# Patient Record
Sex: FEMALE | Race: BLACK OR AFRICAN AMERICAN | NOT HISPANIC OR LATINO | URBAN - METROPOLITAN AREA
[De-identification: names, ages, dates, MRNs, and addresses within clinical notes are randomized per-mention and may not be internally consistent; named-entity substitution may affect disease eponyms.]

---

## 2024-08-09 ENCOUNTER — HOSPITAL ENCOUNTER (EMERGENCY)
Facility: OTHER | Age: 34
Discharge: HOME | End: 2024-08-09
Attending: OBSTETRICS & GYNECOLOGY
Payer: MEDICAID

## 2024-08-09 VITALS
HEART RATE: 104 BPM | SYSTOLIC BLOOD PRESSURE: 118 MMHG | OXYGEN SATURATION: 100 % | TEMPERATURE: 98 F | DIASTOLIC BLOOD PRESSURE: 62 MMHG | RESPIRATION RATE: 17 BRPM

## 2024-08-09 DIAGNOSIS — R12 HEARTBURN DURING PREGNANCY IN THIRD TRIMESTER: ICD-10-CM

## 2024-08-09 DIAGNOSIS — O26.893 HEARTBURN DURING PREGNANCY IN THIRD TRIMESTER: ICD-10-CM

## 2024-08-09 DIAGNOSIS — Z3A.38 38 WEEKS GESTATION OF PREGNANCY: ICD-10-CM

## 2024-08-09 DIAGNOSIS — O09.30 LIMITED PRENATAL CARE, ANTEPARTUM: Primary | ICD-10-CM

## 2024-08-09 LAB
ABO + RH BLD: NORMAL
BASOPHILS # BLD AUTO: 0.03 K/UL (ref 0–0.2)
BASOPHILS NFR BLD: 0.3 % (ref 0–1.9)
BLD GP AB SCN CELLS X3 SERPL QL: NORMAL
DIFFERENTIAL METHOD BLD: ABNORMAL
EOSINOPHIL # BLD AUTO: 0.1 K/UL (ref 0–0.5)
EOSINOPHIL NFR BLD: 1 % (ref 0–8)
ERYTHROCYTE [DISTWIDTH] IN BLOOD BY AUTOMATED COUNT: 20.4 % (ref 11.5–14.5)
HAV IGM SERPL QL IA: NORMAL
HBV CORE IGM SERPL QL IA: NORMAL
HBV SURFACE AG SERPL QL IA: NORMAL
HCT VFR BLD AUTO: 24.4 % (ref 37–48.5)
HCV AB SERPL QL IA: NORMAL
HGB BLD-MCNC: 7.3 G/DL (ref 12–16)
HIV 1+2 AB+HIV1 P24 AG SERPL QL IA: NEGATIVE
IMM GRANULOCYTES # BLD AUTO: 0.07 K/UL (ref 0–0.04)
IMM GRANULOCYTES NFR BLD AUTO: 0.8 % (ref 0–0.5)
LYMPHOCYTES # BLD AUTO: 1.8 K/UL (ref 1–4.8)
LYMPHOCYTES NFR BLD: 20.8 % (ref 18–48)
MCH RBC QN AUTO: 20.2 PG (ref 27–31)
MCHC RBC AUTO-ENTMCNC: 29.9 G/DL (ref 32–36)
MCV RBC AUTO: 68 FL (ref 82–98)
MONOCYTES # BLD AUTO: 0.7 K/UL (ref 0.3–1)
MONOCYTES NFR BLD: 8.4 % (ref 4–15)
NEUTROPHILS # BLD AUTO: 6 K/UL (ref 1.8–7.7)
NEUTROPHILS NFR BLD: 68.7 % (ref 38–73)
NRBC BLD-RTO: 1 /100 WBC
PLATELET # BLD AUTO: 287 K/UL (ref 150–450)
PMV BLD AUTO: 10.2 FL (ref 9.2–12.9)
RBC # BLD AUTO: 3.61 M/UL (ref 4–5.4)
SPECIMEN OUTDATE: NORMAL
TREPONEMA PALLIDUM IGG+IGM AB [PRESENCE] IN SERUM OR PLASMA BY IMMUNOASSAY: NONREACTIVE
WBC # BLD AUTO: 8.73 K/UL (ref 3.9–12.7)

## 2024-08-09 PROCEDURE — 85025 COMPLETE CBC W/AUTO DIFF WBC: CPT

## 2024-08-09 PROCEDURE — 87081 CULTURE SCREEN ONLY: CPT

## 2024-08-09 PROCEDURE — 76815 OB US LIMITED FETUS(S): CPT | Mod: 26,,, | Performed by: OBSTETRICS & GYNECOLOGY

## 2024-08-09 PROCEDURE — 81514 NFCT DS BV&VAGINITIS DNA ALG: CPT

## 2024-08-09 PROCEDURE — 80074 ACUTE HEPATITIS PANEL: CPT

## 2024-08-09 PROCEDURE — 86850 RBC ANTIBODY SCREEN: CPT

## 2024-08-09 PROCEDURE — 87389 HIV-1 AG W/HIV-1&-2 AB AG IA: CPT

## 2024-08-09 PROCEDURE — 86762 RUBELLA ANTIBODY: CPT

## 2024-08-09 PROCEDURE — 87591 N.GONORRHOEAE DNA AMP PROB: CPT

## 2024-08-09 PROCEDURE — 86593 SYPHILIS TEST NON-TREP QUANT: CPT

## 2024-08-09 PROCEDURE — 99284 EMERGENCY DEPT VISIT MOD MDM: CPT | Mod: 25,,, | Performed by: OBSTETRICS & GYNECOLOGY

## 2024-08-09 PROCEDURE — 86901 BLOOD TYPING SEROLOGIC RH(D): CPT

## 2024-08-09 PROCEDURE — 59025 FETAL NON-STRESS TEST: CPT | Mod: 26,59,, | Performed by: OBSTETRICS & GYNECOLOGY

## 2024-08-09 PROCEDURE — 25000003 PHARM REV CODE 250

## 2024-08-09 PROCEDURE — 86787 VARICELLA-ZOSTER ANTIBODY: CPT

## 2024-08-09 PROCEDURE — 59025 FETAL NON-STRESS TEST: CPT

## 2024-08-09 PROCEDURE — 86900 BLOOD TYPING SEROLOGIC ABO: CPT

## 2024-08-09 PROCEDURE — 99284 EMERGENCY DEPT VISIT MOD MDM: CPT | Mod: 25

## 2024-08-09 RX ORDER — FAMOTIDINE 20 MG/1
20 TABLET, FILM COATED ORAL 2 TIMES DAILY
Status: COMPLETED | OUTPATIENT
Start: 2024-08-09 | End: 2024-08-09

## 2024-08-09 RX ADMIN — FAMOTIDINE 20 MG: 20 TABLET, FILM COATED ORAL at 06:08

## 2024-08-09 NOTE — DISCHARGE INSTRUCTIONS
Call clinic [phone] or L & D after hours at [phone] for vaginal bleeding, leakage of fluids, contractions 4-5 in one hour, decreased fetal movements ( 10 kicks in 2 hours), headache not relieved by Tylenol, blurry vision, or temp of 100.4 or greater.  Begin doing fetal kick counts, at least 10 movements in 2 hours starting at 28 weeks gestation.  Keep next clinic appointment

## 2024-08-09 NOTE — ED PROVIDER NOTES
Encounter Date: 8/9/2024       History     Chief Complaint   Patient presents with    Vaginal Bleeding     Nida Iglesias is a 33 y.o with approximated gestation at 38 weeks presents to the OB ED today (08/09/2024) with CC of establishing care.    Patient denies any specific complaints today and is focused on establishing care. She notes irregular cramping/discomfort in the heat, soreness and has acid reflux. Recent spotting on/off. She has previously had yeast infections, however currently denies vaginal irritation.     Patient recently moved back to [place] from [place] where she was living with her baby's father. She notes inconsistent prenatal care however visited a placed called '[facility]' 3-4 times where she was given a due date of August 12th.  Patient notes recent stressors in life and is planning adoption process for child.     Patient denies any known complications with this pregnancy. No daily medications.  She reports no current vaginal bleeding, no leakage of fluid or contractions. Good FM.     This pregnancy is complicated by limited prenatal care.            Review of patient's allergies indicates:  No Known Allergies  No past medical history on file.  No past surgical history on file.  No family history on file.  Social History     Tobacco Use    Smoking status: Every Day     Types: Vaping with nicotine    Smokeless tobacco: Never   Substance Use Topics    Alcohol use: Yes    Drug use: Yes     Types: Marijuana     Review of Systems   Constitutional:  Negative for chills and fever.   Respiratory:  Negative for shortness of breath.    Cardiovascular:  Negative for chest pain.   Gastrointestinal:  Negative for diarrhea, nausea and vomiting.        Heartburn   Genitourinary:  Negative for dysuria, frequency, hematuria, urgency, vaginal bleeding, vaginal discharge and vaginal pain.       Physical Exam     Initial Vitals [08/09/24 1541]   BP Pulse Resp Temp SpO2   130/62 98 17 98.3 °F (36.8 °C)  100 %      MAP       --         Physical Exam    Vitals reviewed.  Constitutional: She appears well-developed and well-nourished.   HENT:   Head: Normocephalic and atraumatic.   Eyes: EOM are normal.   Neck:   Normal range of motion.  Cardiovascular:  Normal rate.           Pulmonary/Chest: No respiratory distress.   Abdominal: Abdomen is soft. There is no abdominal tenderness.   Gravid   Musculoskeletal:         General: Normal range of motion.      Cervical back: Normal range of motion.     Neurological: She is oriented to person, place, and time.   Skin: Skin is warm and dry.   Psychiatric: She has a normal mood and affect.     OB LABOR EXAM:         Vaginal Bleeding: none present.     Dilatation: 0.       Amniotic Fluid Color: no fluid.           ED Course   Obtain Fetal nonstress test (NST)    Date/Time: 8/10/2024 2:25 AM    Performed by: Leigha Denton MD  Authorized by: Leigha Denton MD    Nonstress Test:     Variability:  6-25 BPM    Decelerations:  None    Accelerations:  15 bpm    Baseline:  155    Contractions:  Irregular  Biophysical Profile:     Nonstress Test Interpretation: reactive      Overall Impression:  Reassuring    Labs Reviewed   CBC W/ AUTO DIFFERENTIAL - Abnormal       Result Value    WBC 8.73      RBC 3.61 (*)     Hemoglobin 7.3 (*)     Hematocrit 24.4 (*)     MCV 68 (*)     MCH 20.2 (*)     MCHC 29.9 (*)     RDW 20.4 (*)     Platelets 287      MPV 10.2      Immature Granulocytes 0.8 (*)     Gran # (ANC) 6.0      Immature Grans (Abs) 0.07 (*)     Lymph # 1.8      Mono # 0.7      Eos # 0.1      Baso # 0.03      nRBC 1 (*)     Gran % 68.7      Lymph % 20.8      Mono % 8.4      Eosinophil % 1.0      Basophil % 0.3      Differential Method Automated      Narrative:     Release to patient->Immediate   STREP B SCREEN, VAGINAL / RECTAL   HEPATITIS PANEL, ACUTE    Hepatitis B Surface Ag Non-reactive      Hep B C IgM Non-reactive      Hep A IgM Non-reactive      Hepatitis C Ab Non-reactive       Narrative:     Release to patient->Immediate   HIV 1 / 2 ANTIBODY    HIV 1/2 Ag/Ab Negative      Narrative:     Release to patient->Immediate   TREPONEMA PALLIDIUM ANTIBODIES IGG, IGM    Treponema Pallidum Antibodies (IgG, IgM) Nonreactive      Narrative:     Release to patient->Immediate   VARICELLA ZOSTER ANTIBODY, IGG   C. TRACHOMATIS/N. GONORRHOEAE BY AMP DNA   VAGINOSIS SCREEN BY DNA PROBE   RUBELLA ANTIBODY, IGG   TYPE & SCREEN    Group & Rh O POS      Indirect Kishore NEG      Specimen Outdate 08/12/2024 23:59            Imaging Results    None          Medications   famotidine tablet 20 mg (20 mg Oral Given 8/9/24 1803)     Medical Decision Making  Nida Iglesias is a 33 y.o with 38 week gestation based on unclear dating who presents to the OB ED today (08/09/2024) with CC of establishing care.      Temp:  [98.3 °F (36.8 °C)] 98.3 °F (36.8 °C)  Pulse:  [] 104  Resp:  [17] 17  SpO2:  [100 %] 100 %  BP: (104-130)/(58-75) 118/62        1) Patient presenting to establish care  - Patient with inconsistent care in [place], recently moved to [place]  - No records on file, no dating information  - Limited bedsite growth US in ED showing 38w0d per femur length, however, remaining measurements somewhat smaller  - Full prenatal labs drawn  - Vaginosis/GC/CT/GBS collected  - Patient referred for formal dating/anatomy US and to follow up in [facility] clinic  - Clinic messaged, patient happy with plans    2) Cramping Discomfort   - Irregularly kelly on monitor, patient feeling minimal cramping  - SVE closed, not currently in labor    3) Acid Reflux  - Burning sensation in throat w/epigastric discomfort  - Pepcid given, improved      - Patient stable for discharge at this time  - Notes stressors lately, however feels safe to go home  - ED return precautions given  - She voiced understanding and is in agreement with the plan    Leigha Denton MD  OBGYN   PGY-1                 Attending Attestation:   Physician  Attestation Statement for Resident:  As the supervising MD   Physician Attestation Statement: I have personally seen and examined this patient.   I agree with the above history.  -:   As the supervising MD I agree with the above PE.     As the supervising MD I agree with the above treatment, course, plan, and disposition.   I was personally present during the critical portions of the procedure(s) performed by the resident and was immediately available in the ED to provide services and assistance as needed during the entire procedure.  I have reviewed and agree with the residents interpretation of the following: lab data.  I have reviewed the following: old records at this facility.            Attending ED Notes:   I saw and examined the patient myself along with the resident. I have personally reviewed pertinent prior records, labs, imaging, and procedures. I have reviewed the documentation and agree with the findings and plan as documented.     ~38wga based on reported BOYD, presenting to establish care after recently moving back into [facility]. Stable with no acute emergent concerns, not currently in labor, NST reactive and reassuring, limited bedside US grossly normal. Prenatal labs obtained. Discharge home with close outpatient follow up to establish care and for formal US. Return precautions discussed.    Ines Sethi MD FACOG  OB Hospitalist                               Clinical Impression:  Final diagnoses:  [O09.30] Limited prenatal care, antepartum (Primary)  [Z3A.38] 38 weeks gestation of pregnancy  [O26.893, R12] Heartburn during pregnancy in third trimester          ED Disposition Condition    Discharge Stable          ED Prescriptions    None       Follow-up Information       Follow up With Specialties Details Why Contact Info    [facility] - Emergency Dept (Obstetrics) Emergency Medicine  If symptoms worsen [address]  [facility] [id number] [phone]    [facility]  Obstetrics and Gynecology In 3 days  [address]  [facility] [address]  [phone]             Leigha Denton MD  Resident  08/10/24 0248       Leigha Denton MD  Resident  08/10/24 0251       Ines Sethi MD  08/10/24 1000

## 2024-08-10 LAB
C TRACH DNA SPEC QL NAA+PROBE: NOT DETECTED
N GONORRHOEA DNA SPEC QL NAA+PROBE: NOT DETECTED

## 2024-08-12 ENCOUNTER — TELEPHONE (OUTPATIENT)
Dept: OBSTETRICS AND GYNECOLOGY | Facility: CLINIC | Age: 34
End: 2024-08-12
Payer: MEDICAID

## 2024-08-12 LAB
BACTERIA SPEC AEROBE CULT: NORMAL
BACTERIAL VAGINOSIS DNA: NEGATIVE
CANDIDA GLABRATA DNA: NEGATIVE
CANDIDA KRUSEI DNA: NEGATIVE
CANDIDA RRNA VAG QL PROBE: POSITIVE
RUBV IGG SER-ACNC: 25.6 IU/ML
RUBV IGG SER-IMP: REACTIVE
T VAGINALIS RRNA GENITAL QL PROBE: NEGATIVE
VARICELLA INTERPRETATION: POSITIVE
VARICELLA ZOSTER IGG: 747.1

## 2024-08-12 NOTE — TELEPHONE ENCOUNTER
----- Message from Leigha Denton MD sent at 8/10/2024  2:46 AM CDT -----  Regarding: Establish Care  Hel -    Can we please see this patient in clinic this week to establish care. She just moved from [place]. Estimated at 38 weeks per growth in ED.     Thank you!  Leigha

## 2024-08-15 ENCOUNTER — HOSPITAL ENCOUNTER (INPATIENT)
Facility: OTHER | Age: 34
LOS: 2 days | Discharge: HOME | End: 2024-08-17
Attending: OBSTETRICS & GYNECOLOGY | Admitting: OBSTETRICS & GYNECOLOGY
Payer: MEDICAID

## 2024-08-15 ENCOUNTER — ANESTHESIA EVENT (OUTPATIENT)
Dept: OBSTETRICS AND GYNECOLOGY | Facility: OTHER | Age: 34
End: 2024-08-15
Payer: MEDICAID

## 2024-08-15 ENCOUNTER — TELEPHONE (OUTPATIENT)
Dept: OBSTETRICS AND GYNECOLOGY | Facility: CLINIC | Age: 34
End: 2024-08-15
Payer: MEDICAID

## 2024-08-15 ENCOUNTER — ANESTHESIA (OUTPATIENT)
Dept: OBSTETRICS AND GYNECOLOGY | Facility: OTHER | Age: 34
End: 2024-08-15
Payer: MEDICAID

## 2024-08-15 DIAGNOSIS — Z3A.40 40 WEEKS GESTATION OF PREGNANCY: ICD-10-CM

## 2024-08-15 DIAGNOSIS — O47.9 UTERINE CONTRACTIONS: ICD-10-CM

## 2024-08-15 DIAGNOSIS — Z37.9 NORMAL LABOR: ICD-10-CM

## 2024-08-15 DIAGNOSIS — O42.90 LEAKAGE OF AMNIOTIC FLUID: ICD-10-CM

## 2024-08-15 PROBLEM — O09.30 NO PRENATAL CARE IN CURRENT PREGNANCY: Status: RESOLVED | Noted: 2024-08-15 | Resolved: 2024-08-15

## 2024-08-15 PROBLEM — O09.30 NO PRENATAL CARE IN CURRENT PREGNANCY: Status: ACTIVE | Noted: 2024-08-15

## 2024-08-15 PROBLEM — Z34.90 PREGNANCY WITH FETUS OF UNKNOWN GESTATIONAL AGE: Status: ACTIVE | Noted: 2024-08-15

## 2024-08-15 PROBLEM — Z86.59 HISTORY OF PSYCHIATRIC DISORDER: Status: ACTIVE | Noted: 2024-08-15

## 2024-08-15 PROBLEM — Z34.90 PREGNANCY WITH FETUS OF UNKNOWN GESTATIONAL AGE: Status: RESOLVED | Noted: 2024-08-15 | Resolved: 2024-08-15

## 2024-08-15 LAB
ABO + RH BLD: NORMAL
BASOPHILS # BLD AUTO: 0.06 K/UL (ref 0–0.2)
BASOPHILS NFR BLD: 0.6 % (ref 0–1.9)
BLD GP AB SCN CELLS X3 SERPL QL: NORMAL
DIFFERENTIAL METHOD BLD: ABNORMAL
EOSINOPHIL # BLD AUTO: 0.1 K/UL (ref 0–0.5)
EOSINOPHIL NFR BLD: 0.6 % (ref 0–8)
ERYTHROCYTE [DISTWIDTH] IN BLOOD BY AUTOMATED COUNT: 20.2 % (ref 11.5–14.5)
HCT VFR BLD AUTO: 27.2 % (ref 37–48.5)
HGB BLD-MCNC: 7.7 G/DL (ref 12–16)
IMM GRANULOCYTES # BLD AUTO: 0.08 K/UL (ref 0–0.04)
IMM GRANULOCYTES NFR BLD AUTO: 0.8 % (ref 0–0.5)
LYMPHOCYTES # BLD AUTO: 2.2 K/UL (ref 1–4.8)
LYMPHOCYTES NFR BLD: 23.7 % (ref 18–48)
MCH RBC QN AUTO: 19.4 PG (ref 27–31)
MCHC RBC AUTO-ENTMCNC: 28.3 G/DL (ref 32–36)
MCV RBC AUTO: 69 FL (ref 82–98)
MONOCYTES # BLD AUTO: 0.8 K/UL (ref 0.3–1)
MONOCYTES NFR BLD: 8 % (ref 4–15)
NEUTROPHILS # BLD AUTO: 6.3 K/UL (ref 1.8–7.7)
NEUTROPHILS NFR BLD: 66.3 % (ref 38–73)
NRBC BLD-RTO: 1 /100 WBC
PLATELET # BLD AUTO: 331 K/UL (ref 150–450)
PMV BLD AUTO: 9.9 FL (ref 9.2–12.9)
POCT GLUCOSE: 102 MG/DL (ref 70–110)
RBC # BLD AUTO: 3.97 M/UL (ref 4–5.4)
TREPONEMA PALLIDUM IGG+IGM AB [PRESENCE] IN SERUM OR PLASMA BY IMMUNOASSAY: NONREACTIVE
WBC # BLD AUTO: 9.47 K/UL (ref 3.9–12.7)

## 2024-08-15 PROCEDURE — 27201040 HC RC 50 FILTER

## 2024-08-15 PROCEDURE — 86850 RBC ANTIBODY SCREEN: CPT

## 2024-08-15 PROCEDURE — 86920 COMPATIBILITY TEST SPIN: CPT

## 2024-08-15 PROCEDURE — 0UQG7ZZ REPAIR VAGINA, VIA NATURAL OR ARTIFICIAL OPENING: ICD-10-PCS | Performed by: OBSTETRICS & GYNECOLOGY

## 2024-08-15 PROCEDURE — 63600175 PHARM REV CODE 636 W HCPCS

## 2024-08-15 PROCEDURE — 86593 SYPHILIS TEST NON-TREP QUANT: CPT

## 2024-08-15 PROCEDURE — 59409 OBSTETRICAL CARE: CPT | Mod: GB,,, | Performed by: OBSTETRICS & GYNECOLOGY

## 2024-08-15 PROCEDURE — 25000003 PHARM REV CODE 250

## 2024-08-15 PROCEDURE — 86900 BLOOD TYPING SEROLOGIC ABO: CPT

## 2024-08-15 PROCEDURE — 27200710 HC EPIDURAL INFUSION PUMP SET: Performed by: ANESTHESIOLOGY

## 2024-08-15 PROCEDURE — 99283 EMERGENCY DEPT VISIT LOW MDM: CPT | Mod: 25,,, | Performed by: OBSTETRICS & GYNECOLOGY

## 2024-08-15 PROCEDURE — 59025 FETAL NON-STRESS TEST: CPT | Mod: 26,,, | Performed by: OBSTETRICS & GYNECOLOGY

## 2024-08-15 PROCEDURE — 76815 OB US LIMITED FETUS(S): CPT | Mod: 26,,, | Performed by: OBSTETRICS & GYNECOLOGY

## 2024-08-15 PROCEDURE — 51701 INSERT BLADDER CATHETER: CPT

## 2024-08-15 PROCEDURE — 72100002 HC LABOR CARE, 1ST 8 HOURS

## 2024-08-15 PROCEDURE — 11000001 HC ACUTE MED/SURG PRIVATE ROOM

## 2024-08-15 PROCEDURE — 85025 COMPLETE CBC W/AUTO DIFF WBC: CPT

## 2024-08-15 PROCEDURE — C1751 CATH, INF, PER/CENT/MIDLINE: HCPCS | Performed by: ANESTHESIOLOGY

## 2024-08-15 PROCEDURE — 86901 BLOOD TYPING SEROLOGIC RH(D): CPT

## 2024-08-15 PROCEDURE — 62326 NJX INTERLAMINAR LMBR/SAC: CPT

## 2024-08-15 PROCEDURE — 36415 COLL VENOUS BLD VENIPUNCTURE: CPT | Performed by: OBSTETRICS & GYNECOLOGY

## 2024-08-15 PROCEDURE — 72200005 HC VAGINAL DELIVERY LEVEL II

## 2024-08-15 RX ORDER — TRANEXAMIC ACID 10 MG/ML
1000 INJECTION, SOLUTION INTRAVENOUS EVERY 30 MIN PRN
Status: DISCONTINUED | OUTPATIENT
Start: 2024-08-15 | End: 2024-08-15

## 2024-08-15 RX ORDER — MISOPROSTOL 200 UG/1
800 TABLET ORAL ONCE AS NEEDED
Status: DISCONTINUED | OUTPATIENT
Start: 2024-08-15 | End: 2024-08-17 | Stop reason: HOSPADM

## 2024-08-15 RX ORDER — OXYTOCIN 10 [USP'U]/ML
10 INJECTION, SOLUTION INTRAMUSCULAR; INTRAVENOUS ONCE AS NEEDED
Status: DISCONTINUED | OUTPATIENT
Start: 2024-08-15 | End: 2024-08-15

## 2024-08-15 RX ORDER — SODIUM CHLORIDE 0.9 % (FLUSH) 0.9 %
2 SYRINGE (ML) INJECTION
Status: DISCONTINUED | OUTPATIENT
Start: 2024-08-15 | End: 2024-08-15

## 2024-08-15 RX ORDER — SODIUM CITRATE AND CITRIC ACID MONOHYDRATE 334; 500 MG/5ML; MG/5ML
30 SOLUTION ORAL ONCE
Status: DISCONTINUED | OUTPATIENT
Start: 2024-08-15 | End: 2024-08-15

## 2024-08-15 RX ORDER — MISOPROSTOL 200 UG/1
800 TABLET ORAL ONCE AS NEEDED
Status: DISCONTINUED | OUTPATIENT
Start: 2024-08-15 | End: 2024-08-15

## 2024-08-15 RX ORDER — METHYLERGONOVINE MALEATE 0.2 MG/ML
200 INJECTION INTRAVENOUS ONCE AS NEEDED
Status: DISCONTINUED | OUTPATIENT
Start: 2024-08-15 | End: 2024-08-15

## 2024-08-15 RX ORDER — HYDROCODONE BITARTRATE AND ACETAMINOPHEN 10; 325 MG/1; MG/1
1 TABLET ORAL EVERY 4 HOURS PRN
Status: DISCONTINUED | OUTPATIENT
Start: 2024-08-15 | End: 2024-08-17 | Stop reason: HOSPADM

## 2024-08-15 RX ORDER — SODIUM CHLORIDE, SODIUM LACTATE, POTASSIUM CHLORIDE, CALCIUM CHLORIDE 600; 310; 30; 20 MG/100ML; MG/100ML; MG/100ML; MG/100ML
INJECTION, SOLUTION INTRAVENOUS CONTINUOUS
Status: DISCONTINUED | OUTPATIENT
Start: 2024-08-15 | End: 2024-08-15

## 2024-08-15 RX ORDER — HYDROCODONE BITARTRATE AND ACETAMINOPHEN 5; 325 MG/1; MG/1
1 TABLET ORAL EVERY 4 HOURS PRN
Status: DISCONTINUED | OUTPATIENT
Start: 2024-08-15 | End: 2024-08-17 | Stop reason: HOSPADM

## 2024-08-15 RX ORDER — OXYTOCIN 10 [USP'U]/ML
10 INJECTION, SOLUTION INTRAMUSCULAR; INTRAVENOUS ONCE AS NEEDED
Status: DISCONTINUED | OUTPATIENT
Start: 2024-08-15 | End: 2024-08-17 | Stop reason: HOSPADM

## 2024-08-15 RX ORDER — FENTANYL/BUPIVACAINE/NS/PF 2MCG/ML-.1
PLASTIC BAG, INJECTION (ML) INJECTION
Status: DISCONTINUED | OUTPATIENT
Start: 2024-08-15 | End: 2024-08-15

## 2024-08-15 RX ORDER — OXYTOCIN-SODIUM CHLORIDE 0.9% IV SOLN 30 UNIT/500ML 30-0.9/5 UT/ML-%
95 SOLUTION INTRAVENOUS ONCE
Status: DISCONTINUED | OUTPATIENT
Start: 2024-08-15 | End: 2024-08-15

## 2024-08-15 RX ORDER — CALCIUM CARBONATE 200(500)MG
500 TABLET,CHEWABLE ORAL 3 TIMES DAILY PRN
Status: DISCONTINUED | OUTPATIENT
Start: 2024-08-15 | End: 2024-08-15

## 2024-08-15 RX ORDER — SIMETHICONE 80 MG
1 TABLET,CHEWABLE ORAL 4 TIMES DAILY PRN
Status: DISCONTINUED | OUTPATIENT
Start: 2024-08-15 | End: 2024-08-15

## 2024-08-15 RX ORDER — OXYTOCIN-SODIUM CHLORIDE 0.9% IV SOLN 30 UNIT/500ML 30-0.9/5 UT/ML-%
0-32 SOLUTION INTRAVENOUS CONTINUOUS
Status: DISCONTINUED | OUTPATIENT
Start: 2024-08-15 | End: 2024-08-15

## 2024-08-15 RX ORDER — MISOPROSTOL 200 UG/1
TABLET ORAL
Status: DISPENSED
Start: 2024-08-15 | End: 2024-08-15

## 2024-08-15 RX ORDER — PRENATAL WITH FERROUS FUM AND FOLIC ACID 3080; 920; 120; 400; 22; 1.84; 3; 20; 10; 1; 12; 200; 27; 25; 2 [IU]/1; [IU]/1; MG/1; [IU]/1; MG/1; MG/1; MG/1; MG/1; MG/1; MG/1; UG/1; MG/1; MG/1; MG/1; MG/1
1 TABLET ORAL DAILY
Status: DISCONTINUED | OUTPATIENT
Start: 2024-08-15 | End: 2024-08-17 | Stop reason: HOSPADM

## 2024-08-15 RX ORDER — OXYTOCIN-SODIUM CHLORIDE 0.9% IV SOLN 30 UNIT/500ML 30-0.9/5 UT/ML-%
95 SOLUTION INTRAVENOUS ONCE AS NEEDED
Status: DISCONTINUED | OUTPATIENT
Start: 2024-08-15 | End: 2024-08-17 | Stop reason: HOSPADM

## 2024-08-15 RX ORDER — OXYTOCIN-SODIUM CHLORIDE 0.9% IV SOLN 30 UNIT/500ML 30-0.9/5 UT/ML-%
10 SOLUTION INTRAVENOUS ONCE
Status: DISCONTINUED | OUTPATIENT
Start: 2024-08-15 | End: 2024-08-15

## 2024-08-15 RX ORDER — HYDROCODONE BITARTRATE AND ACETAMINOPHEN 500; 5 MG/1; MG/1
TABLET ORAL
Status: DISCONTINUED | OUTPATIENT
Start: 2024-08-15 | End: 2024-08-15

## 2024-08-15 RX ORDER — OXYTOCIN-SODIUM CHLORIDE 0.9% IV SOLN 30 UNIT/500ML 30-0.9/5 UT/ML-%
10 SOLUTION INTRAVENOUS ONCE AS NEEDED
Status: DISCONTINUED | OUTPATIENT
Start: 2024-08-15 | End: 2024-08-15

## 2024-08-15 RX ORDER — ACETAMINOPHEN 325 MG/1
650 TABLET ORAL EVERY 6 HOURS SCHEDULED
Status: DISCONTINUED | OUTPATIENT
Start: 2024-08-15 | End: 2024-08-17 | Stop reason: HOSPADM

## 2024-08-15 RX ORDER — METHYLERGONOVINE MALEATE 0.2 MG/ML
INJECTION INTRAVENOUS
Status: DISPENSED
Start: 2024-08-15 | End: 2024-08-15

## 2024-08-15 RX ORDER — LIDOCAINE HYDROCHLORIDE 10 MG/ML
10 INJECTION, SOLUTION INFILTRATION; PERINEURAL ONCE AS NEEDED
Status: DISCONTINUED | OUTPATIENT
Start: 2024-08-15 | End: 2024-08-15

## 2024-08-15 RX ORDER — DIPHENOXYLATE HYDROCHLORIDE AND ATROPINE SULFATE 2.5; .025 MG/1; MG/1
2 TABLET ORAL EVERY 6 HOURS PRN
Status: DISCONTINUED | OUTPATIENT
Start: 2024-08-15 | End: 2024-08-17 | Stop reason: HOSPADM

## 2024-08-15 RX ORDER — LIDOCAINE HYDROCHLORIDE AND EPINEPHRINE 15; 5 MG/ML; UG/ML
INJECTION, SOLUTION EPIDURAL
Status: DISCONTINUED | OUTPATIENT
Start: 2024-08-15 | End: 2024-08-15

## 2024-08-15 RX ORDER — DIPHENHYDRAMINE HCL 25 MG
25 CAPSULE ORAL EVERY 4 HOURS PRN
Status: DISCONTINUED | OUTPATIENT
Start: 2024-08-15 | End: 2024-08-17 | Stop reason: HOSPADM

## 2024-08-15 RX ORDER — MEDROXYPROGESTERONE ACETATE 150 MG/ML
150 INJECTION, SUSPENSION INTRAMUSCULAR ONCE
Status: COMPLETED | OUTPATIENT
Start: 2024-08-16 | End: 2024-08-16

## 2024-08-15 RX ORDER — CARBOPROST TROMETHAMINE 250 UG/ML
250 INJECTION, SOLUTION INTRAMUSCULAR
Status: DISCONTINUED | OUTPATIENT
Start: 2024-08-15 | End: 2024-08-15

## 2024-08-15 RX ORDER — DIPHENOXYLATE HYDROCHLORIDE AND ATROPINE SULFATE 2.5; .025 MG/1; MG/1
2 TABLET ORAL EVERY 6 HOURS PRN
Status: DISCONTINUED | OUTPATIENT
Start: 2024-08-15 | End: 2024-08-15

## 2024-08-15 RX ORDER — CARBOPROST TROMETHAMINE 250 UG/ML
250 INJECTION, SOLUTION INTRAMUSCULAR
Status: DISCONTINUED | OUTPATIENT
Start: 2024-08-15 | End: 2024-08-17 | Stop reason: HOSPADM

## 2024-08-15 RX ORDER — METHYLERGONOVINE MALEATE 0.2 MG/ML
200 INJECTION INTRAVENOUS ONCE AS NEEDED
Status: DISCONTINUED | OUTPATIENT
Start: 2024-08-15 | End: 2024-08-17 | Stop reason: HOSPADM

## 2024-08-15 RX ORDER — DIPHENHYDRAMINE HYDROCHLORIDE 50 MG/ML
25 INJECTION INTRAMUSCULAR; INTRAVENOUS EVERY 4 HOURS PRN
Status: DISCONTINUED | OUTPATIENT
Start: 2024-08-15 | End: 2024-08-17 | Stop reason: HOSPADM

## 2024-08-15 RX ORDER — OXYTOCIN-SODIUM CHLORIDE 0.9% IV SOLN 30 UNIT/500ML 30-0.9/5 UT/ML-%
10 SOLUTION INTRAVENOUS ONCE AS NEEDED
Status: COMPLETED | OUTPATIENT
Start: 2024-08-15 | End: 2024-08-15

## 2024-08-15 RX ORDER — FENTANYL/BUPIVACAINE/NS/PF 2MCG/ML-.1
PLASTIC BAG, INJECTION (ML) INJECTION
Status: DISPENSED
Start: 2024-08-15 | End: 2024-08-15

## 2024-08-15 RX ORDER — FAMOTIDINE 10 MG/ML
20 INJECTION INTRAVENOUS ONCE
Status: DISCONTINUED | OUTPATIENT
Start: 2024-08-15 | End: 2024-08-15

## 2024-08-15 RX ORDER — SODIUM CHLORIDE 9 MG/ML
INJECTION, SOLUTION INTRAVENOUS
Status: DISCONTINUED | OUTPATIENT
Start: 2024-08-15 | End: 2024-08-15

## 2024-08-15 RX ORDER — TRANEXAMIC ACID 10 MG/ML
1000 INJECTION, SOLUTION INTRAVENOUS EVERY 30 MIN PRN
Status: DISCONTINUED | OUTPATIENT
Start: 2024-08-15 | End: 2024-08-17 | Stop reason: HOSPADM

## 2024-08-15 RX ORDER — SODIUM CHLORIDE 0.9 % (FLUSH) 0.9 %
10 SYRINGE (ML) INJECTION
Status: DISCONTINUED | OUTPATIENT
Start: 2024-08-15 | End: 2024-08-17 | Stop reason: HOSPADM

## 2024-08-15 RX ORDER — IBUPROFEN 600 MG/1
600 TABLET ORAL EVERY 6 HOURS
Status: DISCONTINUED | OUTPATIENT
Start: 2024-08-15 | End: 2024-08-17 | Stop reason: HOSPADM

## 2024-08-15 RX ORDER — SIMETHICONE 80 MG
1 TABLET,CHEWABLE ORAL EVERY 6 HOURS PRN
Status: DISCONTINUED | OUTPATIENT
Start: 2024-08-15 | End: 2024-08-17 | Stop reason: HOSPADM

## 2024-08-15 RX ORDER — OXYTOCIN-SODIUM CHLORIDE 0.9% IV SOLN 30 UNIT/500ML 30-0.9/5 UT/ML-%
95 SOLUTION INTRAVENOUS ONCE AS NEEDED
Status: DISCONTINUED | OUTPATIENT
Start: 2024-08-15 | End: 2024-08-15

## 2024-08-15 RX ORDER — OXYTOCIN-SODIUM CHLORIDE 0.9% IV SOLN 30 UNIT/500ML 30-0.9/5 UT/ML-%
95 SOLUTION INTRAVENOUS ONCE
Status: DISCONTINUED | OUTPATIENT
Start: 2024-08-15 | End: 2024-08-17 | Stop reason: HOSPADM

## 2024-08-15 RX ORDER — DOCUSATE SODIUM 100 MG/1
200 CAPSULE, LIQUID FILLED ORAL 2 TIMES DAILY PRN
Status: DISCONTINUED | OUTPATIENT
Start: 2024-08-15 | End: 2024-08-17 | Stop reason: HOSPADM

## 2024-08-15 RX ORDER — HYDROCORTISONE 25 MG/G
CREAM TOPICAL 3 TIMES DAILY PRN
Status: DISCONTINUED | OUTPATIENT
Start: 2024-08-15 | End: 2024-08-17 | Stop reason: HOSPADM

## 2024-08-15 RX ORDER — ONDANSETRON 8 MG/1
8 TABLET, ORALLY DISINTEGRATING ORAL EVERY 8 HOURS PRN
Status: DISCONTINUED | OUTPATIENT
Start: 2024-08-15 | End: 2024-08-17 | Stop reason: HOSPADM

## 2024-08-15 RX ORDER — ONDANSETRON 8 MG/1
8 TABLET, ORALLY DISINTEGRATING ORAL EVERY 8 HOURS PRN
Status: DISCONTINUED | OUTPATIENT
Start: 2024-08-15 | End: 2024-08-15

## 2024-08-15 RX ADMIN — IBUPROFEN 600 MG: 600 TABLET, FILM COATED ORAL at 08:08

## 2024-08-15 RX ADMIN — DOCUSATE SODIUM 200 MG: 100 CAPSULE, LIQUID FILLED ORAL at 08:08

## 2024-08-15 RX ADMIN — SODIUM CHLORIDE, POTASSIUM CHLORIDE, SODIUM LACTATE AND CALCIUM CHLORIDE 1000 ML: 600; 310; 30; 20 INJECTION, SOLUTION INTRAVENOUS at 03:08

## 2024-08-15 RX ADMIN — OXYTOCIN-SODIUM CHLORIDE 0.9% IV SOLN 30 UNIT/500ML 10 UNITS: 30-0.9/5 SOLUTION at 06:08

## 2024-08-15 RX ADMIN — Medication 5 ML: at 03:08

## 2024-08-15 RX ADMIN — ACETAMINOPHEN 650 MG: 325 TABLET, FILM COATED ORAL at 01:08

## 2024-08-15 RX ADMIN — Medication 8 ML/HR: at 03:08

## 2024-08-15 RX ADMIN — IBUPROFEN 600 MG: 600 TABLET, FILM COATED ORAL at 07:08

## 2024-08-15 RX ADMIN — LIDOCAINE HYDROCHLORIDE,EPINEPHRINE BITARTRATE 3 ML: 15; .005 INJECTION, SOLUTION EPIDURAL; INFILTRATION; INTRACAUDAL; PERINEURAL at 03:08

## 2024-08-15 RX ADMIN — ACETAMINOPHEN 650 MG: 325 TABLET, FILM COATED ORAL at 07:08

## 2024-08-15 RX ADMIN — ACETAMINOPHEN 650 MG: 325 TABLET, FILM COATED ORAL at 08:08

## 2024-08-15 RX ADMIN — Medication 4 MILLI-UNITS/MIN: at 05:08

## 2024-08-15 RX ADMIN — IBUPROFEN 600 MG: 600 TABLET, FILM COATED ORAL at 01:08

## 2024-08-15 RX ADMIN — BUPIVACAINE HYDROCHLORIDE 5 ML: 2.5 INJECTION, SOLUTION EPIDURAL; INFILTRATION; INTRACAUDAL; PERINEURAL at 03:08

## 2024-08-15 RX ADMIN — SODIUM CHLORIDE, POTASSIUM CHLORIDE, SODIUM LACTATE AND CALCIUM CHLORIDE: 600; 310; 30; 20 INJECTION, SOLUTION INTRAVENOUS at 04:08

## 2024-08-15 NOTE — ANESTHESIA PROCEDURE NOTES
Epidural    Patient location during procedure: OB   Reason for block: primary anesthetic   Reason for block: labor analgesia requested by patient and obstetrician  Diagnosis: IUP   Start time: 8/15/2024 3:10 AM  Timeout: 8/15/2024 3:10 AM  End time: 8/15/2024 3:33 AM  Surgery related to: Vaginal Delivery    Staffing  Performing Provider: Ramin Ramírez DO  Authorizing Provider: Gómez Phelps MD    Staffing  Performed by: Ramin Ramírez DO  Authorized by: Gómez Phelps MD        Preanesthetic Checklist  Completed: patient identified, IV checked, site marked, risks and benefits discussed, surgical consent, monitors and equipment checked, pre-op evaluation, timeout performed, anesthesia consent given, hand hygiene performed and patient being monitored  Preparation  Patient position: sitting  Prep: ChloraPrep  Patient monitoring: Pulse Ox  Reason for block: primary anesthetic   Epidural  Skin Anesthetic: lidocaine 1%  Skin Wheal: 3 mL  Administration type: continuous  Approach: midline  Interspace: L3-4    Injection technique: AIDA saline  Needle and Epidural Catheter  Needle type: Tuohy   Needle gauge: 17  Needle length: 3.5 inches  Needle insertion depth: 4 cm  Catheter type: [employer]  Catheter size: 19 G  Catheter at skin depth: 8 cm  Insertion Attempts: 1  Test dose: 3 mL of lidocaine 1.5% with Epi 1-to-200,000  Additional Documentation: incremental injection, negative aspiration for heme and CSF, no paresthesia on injection, no signs/symptoms of IV or SA injection, no significant pain on injection and no significant complaints from patient  Needle localization: anatomical landmarks  Medications:  Volume per aspiration: 5 mL  Time between aspirations: 5 minutes   Assessment  Ease of block: easy  Patient's tolerance of the procedure: comfortable throughout block and no complaints No inadvertent dural puncture with Tuohy.  Dural puncture performed with spinal needle.

## 2024-08-15 NOTE — LACTATION NOTE
08/15/24 1330   Maternal Assessment   Breast Shape Bilateral:;round   Breast Density Bilateral:;soft   Areola Bilateral:;elastic   Nipples Bilateral:;everted   Maternal Infant Feeding   Maternal Emotional State assist needed   Infant Positioning cross-cradle   Signs of Milk Transfer audible swallow   Pain with Feeding no   Latch Assistance yes     Pt completing feeding on right side when LC entered room. With permission, Pt agreeable for LC to provide assistance. Pt awkward with positioning and needing prompts to support baby. Baby needing breast compression and stimulation. Lactation Basics education completed. LC reviewed Breastfeeding section and encouraged tracking feeds and output. Encouraged use of STS, frequent feeds based on baby's cues, and avoiding artificial nipples. Pt verbalized understanding and questions answered. Pt aware to call LC for assistance with feeding.

## 2024-08-15 NOTE — ED PROVIDER NOTES
Encounter Date: 8/15/2024       History     Chief Complaint   Patient presents with    Rupture of Membranes     At 0010     Nida Iglesias is a 33 y.o.  female with IUP at unknown gestational age who presents with SROM around 0000 8/15. Patient reports a large gush of fluid at that time which has continued to leak since. She reports contractions began shortly after presenting to the hospital which are frequent and painful. She denies any further complaints at this time.    Patient reports contractions, denies vaginal bleeding, reports LOF.   Fetal Movement: normal.     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     Patient has had limited/no prenatal care. Presented to OB ED 2024 where prenatal labs were obtained, and she was scheduled for an appointment with [facility] tomorrow. Pt reportedly visited another clinic in [place] 3-4 times early this pregnancy and was given an BOYD of 2024. Bedside ultrasound on 2024 revealed femur length measuring 38w0d. Previously reported BOYD of 2024 puts patient at 40w3d today. Unknown LMP, reports sometime in November but she took Plan B around that time and is not sure if the bleeding was her menstrual cycle or a side effect of the Plan B.     Previous documentation reports patient considering adoption. Patient reports still considering adoption although no plans are in place.            Review of patient's allergies indicates:  No Known Allergies  History reviewed. No pertinent past medical history.  History reviewed. No pertinent surgical history.  No family history on file.  Social History     Tobacco Use    Smoking status: Every Day     Types: Vaping with nicotine    Smokeless tobacco: Never   Substance Use Topics    Alcohol use: Yes    Drug use: Yes     Types: Marijuana     Review of Systems   Respiratory:  Negative for shortness of breath.    Cardiovascular:  Negative for chest  pain.   Gastrointestinal:  Positive for abdominal pain. Negative for vomiting.   Genitourinary:  Negative for vaginal bleeding.       Physical Exam     Initial Vitals   BP Pulse Resp Temp SpO2   08/15/24 0240 08/15/24 0215 08/15/24 0240 08/15/24 0240 08/15/24 0215   116/71 (!) 114 17 98.3 °F (36.8 °C) 100 %      MAP       --              Temp:  [97.7 °F (36.5 °C)-98.3 °F (36.8 °C)] 97.7 °F (36.5 °C)  Pulse:  [] 117  Resp:  [17] 17  SpO2:  [93 %-100 %] 100 %  BP: (101-130)/(54-83) 101/54    Physical Exam    Nursing note and vitals reviewed.  Constitutional: She appears well-developed and well-nourished. She is not diaphoretic. She appears distressed (secondary to pain from contractions).   HENT:   Head: Normocephalic and atraumatic.   Eyes: EOM are normal.   Neck:   Normal range of motion.  Cardiovascular:  Normal rate.           Pulmonary/Chest: No respiratory distress.   Abdominal: Distension: gravid abdomen. There is no abdominal tenderness. There is no rebound and no guarding.   Musculoskeletal:         General: No tenderness or edema. Normal range of motion.      Cervical back: Normal range of motion.     Neurological: She is alert and oriented to person, place, and time.   Skin: Skin is warm and dry. No rash noted.   Psychiatric: She has a normal mood and affect.     OB LABOR EXAM:     Membranes ruptured: Yes.   Method: Sterile speculum exam per MD and Sterile vaginal exam per MD.       Dilatation: 3.   Station: -2.   Effacement: 60%.   Amniotic Fluid Color: clear.   Amniotic Fluid Amount: small.   Comments: SSE: +pooling, +nitrazine, +ferning, no history of HSV reported and no lesions visualized on exam; cervix visually dilated with fetal scalp visible       ED Course   Obtain Fetal nonstress test (NST)    Date/Time: 8/15/2024 2:06 AM    Performed by: Naomi Cavazos MD  Authorized by: Carmenza Burks MD    Nonstress Test:     Variability:  6-25 BPM    Decelerations:  None    Accelerations:  Absent     Baseline:  140    Contractions:  Regular    Contraction Frequency:  Q1-2 min  Biophysical Profile:     Nonstress Test Interpretation: reactive      Overall Impression:  Reassuring  Post-procedure:     Patient tolerance:  Patient tolerated the procedure well with no immediate complications         Imaging Results    None                Medical Decision Making  Nida Iglesias is a 33 y.o.  female with IUP at 40w3d by stated BOYD  presents with SROM around 0000 8/15 accompanied by frequent painful contractions.     Unknown gestational age (see HPI for further) but most reliable report is BOYD of 2024 based on reported early pregnancy ultrasound making patient 40w3d today, c/w reported LMP of late October/early November. Previous femur length measurement by unofficial dating ultrasound would make patient 38w6d.    Suspected Rupture of Membranes  - Grossly ruptured on exam as above    Painful Uterine Contractions  - Cervix 3/60/-2 with ctx q1-2 mins  - Given 1L LR bolus in OB ED for tachysystole    Patient to be admitted to L&D for management of labor in setting of SROM. Consents signed for delivery and blood transfusion signed and to chart. Cephalic presentation confirmed on ultrasound. Please see H&P for further details/assessment/plan.     Naomi Cavazos MD  OB/GYN PGY-1              Attending Attestation:   Physician Attestation Statement for Resident:  As the supervising MD   Physician Attestation Statement: I have personally seen and examined this patient.   I agree with the above history.  -:   As the supervising MD I agree with the above PE.     As the supervising MD I agree with the above treatment, course, plan, and disposition.   -: I agree with the above edited resident note. Pt seen and examined, chart and labs reviewed.    Briefly, 32 yo G1 at 40w3d by stated BOYD  presents with SROM in early labor. Afebrile, VSS. NST Cat I reactive, [place] Q1 min--> IV fluid bolus given in RUBY. SROM  confirmed on exam, SVE 3/60/-2. Pregnancy c/b scant prenatal care- pt reports early ultrasound in [place], however no regular f/u and no care in [place] outside of RUBY visit 8/9. Stated BOYD 8/12 per pt c/w femur length done 8/9. PNLs collected at prior RU[facility] visit- O+, abs neg, HIV NR, Trep Ab neg, HCV neg, HBsAg neg, GC/CT neg, RI, VI, GBS neg. H&H noted to be low at 7.3/24.2, slightly improveed today to 7.7/27.2. Consents for delivery and blood signed, cephalic presentation confirmed via ultrasound. Admit to L&D for normal labor.     All questions answered. Admit to L&D for further mgmt.     Maryann Larson MD  OB Hospitalist  8/15/2024     I was personally present during the critical portions of the procedure(s) performed by the resident and was immediately available in the ED to provide services and assistance as needed during the entire procedure.  I have reviewed and agree with the residents interpretation of the following: lab data.  I have reviewed the following: old records at this facility.                                       Clinical Impression:  Final diagnoses:  [O42.90] Leakage of amniotic fluid (Primary)  [O47.9] Uterine contractions  [O80, Z37.9] Normal labor  [Z3A.40] 40 weeks gestation of pregnancy          ED Disposition Condition    Send to L&D                 Maryann Larson MD  08/15/24 0510

## 2024-08-15 NOTE — PLAN OF CARE
"Patient admitted to hospital for delivery of a baby girl. Patient presents sitting up in bed while holding the baby girl. She is alert with disorganized thinking such as from subject to subject. Patient denies any suicidal or homicidal ideations. STEPAN provided patient with contact information for [facility], [facility] [employer] Help Line, and [employer] Mental Illness.     Patient is considering adoption however no concrete decision has not been made. STEPAN provided patient with resources for adoption including [employer] and [employer]. Should she decide against adoption, STEPAN provided patient with community resources, including [facility], [facility].   08/15/24 9757   OB SCREEN   Assessment Type Discharge Planning Assessment   Source of Information patient;health record   Received Prenatal Care Yes, Late   Any indications/suspicions for Substance use/disorder  (Patient states "I smoke weed.")   Is this a teen pregnancy No   Is the baby in NICU No   Indication for adoption/[facility] Haven Yes   Indication for DME/post-acute needs No   HIV (+) No   Any congenital  disorders No   Fetal demise/ death No       "

## 2024-08-15 NOTE — TELEPHONE ENCOUNTER
----- Message from Fatimah Dobson sent at 8/15/2024  1:36 PM CDT -----  Pt called stating she is returning the office call in regards to her appointment tomorrow. The pt states she gave birth on yesterday, and wants to know If she can get copies of her u/s. She also wants to know if she needs to schedule her pp visit now.

## 2024-08-15 NOTE — ANESTHESIA PREPROCEDURE EVALUATION
[facility]  Anesthesia Pre-Operative Evaluation         Patient Name: Nida Iglesias  YOB: 1990  MRN: [id number]    08/15/2024      Nida Iglesias is a 33 y.o. female  @ 40w3d who presents in labor. Reports no complications with pregnancy. Hgb 7.7. C/b anemia and hx of ddelusions. From out of town. Denies bleeding disorders, clotting disorders, and back problems.     OB History    Para Term  AB Living   3       2     SAB IAB Ectopic Multiple Live Births   1 1            # Outcome Date GA Lbr [place]/2nd Weight Sex Type Anes PTL Lv   3 Current            2 IAB            1 SAB                Review of patient's allergies indicates:  No Known Allergies    Wt Readings from Last 1 Encounters:   23 1832 54.4 kg (120 lb)       BP Readings from Last 3 Encounters:   08/15/24 116/71   24 118/62   23 110/75       Patient Active Problem List   Diagnosis    Normal labor    No prenatal care in current pregnancy    Pregnancy with fetus of unknown gestational age    History of psychiatric disorder       History reviewed. No pertinent surgical history.    Social History     Socioeconomic History    Marital status: Single   Tobacco Use    Smoking status: Every Day     Types: Vaping with nicotine    Smokeless tobacco: Never   Substance and Sexual Activity    Alcohol use: Yes    Drug use: Yes     Types: Marijuana         Chemistry        Component Value Date/Time     2023    K 3.9 2023     2023    CO2 20 (L) 2023    BUN 5 (L) 2023    CREATININE 0.82 2023    GLU 78 2023        Component Value Date/Time    CALCIUM 9.0 2023    ALKPHOS 61 2023 1950    AST 29 2023    ALT 14 2023    BILITOT 0.3 2023            Lab Results   Component Value Date    WBC 9.47 08/15/2024    HGB 7.7 (L) 08/15/2024    HCT 27.2 (L) 08/15/2024    MCV 69 (L)  "08/15/2024     08/15/2024       No results for input(s): "PT", "INR", "PROTIME", "APTT" in the last 72 hours.          Pre-op Assessment    I have reviewed the Patient Summary Reports.     I have reviewed the Nursing Notes. I have reviewed the NPO Status.   I have reviewed the Medications.     Review of Systems  Anesthesia Hx:             Denies Family Hx of Anesthesia complications.    Denies Personal Hx of Anesthesia complications.                    Psych:  Psychiatric History                  Physical Exam  General: Well nourished, Cooperative, Alert and Oriented    Airway:  Mallampati: II   Tongue: Normal    Dental:  Periodontal disease        Anesthesia Plan  Type of Anesthesia, risks & benefits discussed:    Anesthesia Type: Gen ETT  Intra-op Monitoring Plan: Standard ASA Monitors  Post Op Pain Control Plan: multimodal analgesia and IV/PO Opioids PRN  Induction:  IV  Airway Plan: Direct and Video  Informed Consent: Informed consent signed with the Patient and all parties understand the risks and agree with anesthesia plan.  All questions answered. Patient consented to blood products? Yes  ASA Score: 2  Day of Surgery Review of History & Physical: H&P Update referred to the surgeon/provider.    Ready For Surgery From Anesthesia Perspective.     .      "

## 2024-08-15 NOTE — CARE UPDATE
Resident to bedside to sign placenta release form.  Warned of possible infection, pt voiced understanding.  Consent formed signed and added to chart.    Gabe Du MD MS  OB/Gyn  PGY-2

## 2024-08-15 NOTE — TELEPHONE ENCOUNTER
Returned pt's call- advised her that she can call  to schedule 4 week Pp appt. Advised her would have to go thru Medical records for US pics.

## 2024-08-15 NOTE — PLAN OF CARE
Pt received arousable, alert, breathing spontaneously on room air in; no obvious physical distress  NICOLE via PIV to left arm for hydration, site WNL  VSS and WNL  S/p Epidural   Postpartum assessment WNL  Regular diet & fluids tolerated well.  Interventions and assessments as per charted.  Education, advice, reassurance and psych support on the labor management and delivery process provided.  Close obs & safety measures ensured. Management continues. Allowed to progress.

## 2024-08-15 NOTE — L&D DELIVERY NOTE
"[facility] - Labor & Delivery  Vaginal Delivery   Operative Note    SUMMARY     Normal spontaneous vaginal delivery of live infant after approximately 60 minutes of maternal pushing effort. Infant delivered in OA presentation. Nuchal cord x1 was noted and reduced following delivery. Infant was placed on mothers abdomen for skin to skin and bulb suctioning performed. Delayed cord clamping was performed. Cord was cut and clamped and cord blood was collected.    Spontaneous delivery of placenta with gentle traction applied. IV pitocin was given noting good uterine tone. No trailing membranes were noted on bimanual examination. Placenta was inspected and noted to be intact.    Small vaginal laceration noted, repaired with 2-0 vicryl .    Patient tolerated delivery well. Sponge, needle, and lap counted correctly x2.  cc.     Carmenza Burks MD  OB/GYN PGY-2     Indications:  (spontaneous vaginal delivery)  Pregnancy complicated by:   Patient Active Problem List   Diagnosis     (spontaneous vaginal delivery)    History of psychiatric disorder     Admitting GA: 40w3d    Delivery Information for Joanne Iglesias    Birth information:  YOB: 2022   Time of birth: 6:01 AM   Sex: female   Head Delivery Date/Time: 8/15/2024  6:01 AM   Delivery type: Vaginal, Spontaneous   Gestational Age: 40w3d        Delivery Providers    Delivering clinician: Guanakito Lr MD   Provider Role    Carmenza Burks MD Resident    Chanel Lombardo, RN Delivery Nurse    Kelli Tineo, RN Delivery Nurse    Smita Armenta, RN Delivery Nurse    Zurdo Wiley, RN Delivery Nurse    [facility]              Measurements    Weight: 3210 g  Weight (lbs): 7 lb 1.2 oz  Length: 50.8 cm  Length (in): 20"  Head circumference: 33.5 cm  Chest circumference: 33 cm         Apgars    Living status: Living  Apgar Component Scores:  1 min.:  5 min.:  10 min.:  15 min.:  20 min.:    Skin color:  " 0  1       Heart rate:  2  2       Reflex irritability:  2  2       Muscle tone:  2  2       Respiratory effort:  2  2       Total:  8  9       Apgars assigned by: FARSHAD LEVY RN         Operative Delivery    Forceps attempted?: No  Vacuum extractor attempted?: No         Shoulder Dystocia    Shoulder dystocia present?: No           Presentation    Presentation: Vertex  Position: Middle Occiput Anterior           Interventions/Resuscitation    Method: Bulb Suctioning, Tactile Stimulation       Cord    Vessels: 3 vessels  Complications: Nuchal  Nuchal Intervention: reduced  Nuchal Cord Description: loose nuchal cord  Number of Loops: 1  Delayed Cord Clamping?: Yes  Cord Clamped Date/Time: 8/15/2024  6:02 AM  Cord Blood Disposition: Sent with Baby  Gases Sent?: No  Stem Cell Collection (by MD): No       Placenta    Placenta delivery date/time: 8/15/2024 0605  Placenta removal: Spontaneous  Placenta appearance: Intact  Placenta disposition: Family           Labor Events:       labor: No     Labor Onset Date/Time:         Dilation Complete Date/Time: 08/15/2024 05:00     Start Pushing Date/Time: 08/15/2024 05:05       Start Pushing Date/Time: 08/15/2024 05:05     Rupture Date/Time: 08/15/24  0010         Rupture type: SRM (Spontaneous Rupture)         Fluid Amount:       Fluid Color: Clear               steroids: None     Antibiotics given for GBS: No     Induction: none     Indications for induction:        Augmentation: other (see comments)     Indications for augmentation:       Labor complications: None     Additional complications:          Cervical ripening:                     Delivery:      Episiotomy: None     Indication for Episiotomy:       Perineal Lacerations: None Repaired:      Periurethral Laceration:   Repaired:     Labial Laceration:   Repaired:     Sulcus Laceration:   Repaired:     Vaginal Laceration: Yes Repaired: Yes   Cervical Laceration:   Repaired:     Repair suture:       Repair #  of packets: 1     Last Value - EBL - Nursing (mL):       Sum - EBL - Nursing (mL): 0     Last Value - EBL - Anesthesia (mL):      Calculated QBL (mL): 100      Running total QBL (mL): 100      Vaginal Sweep Performed: No     Surgicount Correct: Yes     Vaginal Packing: No Quantity:       Other providers:       Anesthesia    Method: Epidural          Details (if applicable):  Trial of Labor      Categorization:      Priority:     Indications for :     Incision Type:       Additional  information:  Forceps:    Vacuum:    Breech:    Observed anomalies    Other (Comments):         I was present and scrubbed for the delivery above. I agree with the delivery note as written above.     Guanakito Lr  8/15/2024

## 2024-08-15 NOTE — H&P
HISTORY AND PHYSICAL                                                OBSTETRICS          Subjective:       Nida Iglesias is a 33 y.o.  female with IUP at unknown gestational age who presents with SROM at 0010 8/15 and subsequent frequent contractions. She was admitted to L&D for labor.    Patient has had limited/no prenatal care. Presented to OB ED once on 2024 where prenatal labs were obtained, and she was scheduled for an appointment with [facility] tomorrow. Pt reportedly visited another clinic in [place] 3-4 times early this pregnancy and was given an BOYD of 2024, however records not available. Bedside ultrasound on 2024 revealed femur length measuring 38w0d. Previously reported BOYD of 2024 puts patient at 40w3d today. Unknown LMP, pt reports sometime in November but she took Plan B around that time and is not sure if the bleeding was her menstrual cycle or a side effect of the Plan B.    Previous documentation reports patient considering adoption. Patient reports still considering adoption although no plans are in place.    Patient reports contractions, denies vaginal bleeding, reports LOF.   Fetal Movement: normal.    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    Review of Systems   Gastrointestinal:  Positive for abdominal pain (contractions).   Genitourinary:  Positive for vaginal discharge (amniotic fluid).   All other systems reviewed and are negative.      PMHx: History reviewed. No pertinent past medical history.    PSHx: History reviewed. No pertinent surgical history.    All: Review of patient's allergies indicates:  No Known Allergies    Meds:   No medications prior to admission.       SH:   Social History     Socioeconomic History    Marital status: Single   Tobacco Use    Smoking status: Every Day     Types: Vaping with nicotine    Smokeless tobacco: Never   Substance and Sexual Activity    Alcohol use:  Yes    Drug use: Yes     Types: Marijuana       FH: No family history on file.    OBHx:   OB History    Para Term  AB Living   3 0 0 0 2 0   SAB IAB Ectopic Multiple Live Births   1 1 0 0 0      # Outcome Date GA Lbr Austen/2nd Weight Sex Type Anes PTL Lv   3 Current            2 IAB            1 SAB                Objective:       /71   Pulse 108   Temp 98.3 °F (36.8 °C) (Oral)   Resp 17   LMP 10/28/2023 (Approximate)   SpO2 100%   Breastfeeding No     Vitals:    08/15/24 0215 08/15/24 0230 08/15/24 0240 08/15/24 0245   BP:   116/71    Pulse: (!) 114 (!) 115 106 108   Resp:   17    Temp:   98.3 °F (36.8 °C)    TempSrc:   Oral    SpO2: 100% 100% 100% 100%       General:   alert, appears stated age and cooperative, no apparent distress   HENT:  normocephalic, atraumatic   Eyes:  extraocular movements and conjunctivae normal   Neck:  supple, range of motion normal, no thyromegaly   Lungs:   no respiratory distress   Heart:   regular rate   Abdomen:  soft, non-tender, non-distended but gravid, no rebound or guarding   Extremities negative edema, negative erythema   FHT: 140 bpm, moderate variability, no accels, -decels;  Cat 1 (reassuring)                 TOCO: Q 1-3 minutes   Presentations: cephalic by ultrasound   Cervix:     Dilation: 3cm    Effacement: 60%    Station:  -2    Consistency: medium    Position: middle   Sterile Speculum Exam: patient denies hx of HSV, no lesions visualized on external genitalia or internal on speculum exam    EFW by Leopold's: 6#    Recent Growth Scan: no official growth; femur length c/w 38w0d on bedside ultrasound in OB ED     Lab Review  Blood Type O POS  GBBS: negative  Rubella: Immune  Treponema: NR , repeat pending on admit  HIV: negative  HepB: negative       Assessment:       38w6d weeks gestation with SROM and regular contractions admitted for labor.    Active Hospital Problems    Diagnosis  POA    *Normal labor [O80, Z37.9]  Not Applicable    No  prenatal care in current pregnancy [O09.30]  Not Applicable    Pregnancy with fetus of unknown gestational age [Z34.90]  Not Applicable    History of psychiatric disorder [Z86.59]  Not Applicable      Resolved Hospital Problems   No resolved problems to display.          Plan:        SROM/Labor  Risks, benefits, alternatives and possible complications have been discussed in detail with the patient.   - Consents signed and to chart  - Admit to Labor and Delivery unit  - Will monitor patient's progress and consider pitocin augmentation if needed   - Epidural per Anesthesia  - Draw CBC, T&S, Treponema  - Notify Staff  - Recheck in 2-4 hrs or PRN  - EFW unknown, Leopold 6#  - Maternal pelvis unproven  - Desires Depo for contraception, ordered on admit    Limited/No Prenatal Care  - One prior visit to OB ED with brief dating US showing femur length of 38w0d  - Prenatal labs drawn at that time    Considering Adoption  - SW consult placed, appreciate assistance    History of Delusional vs Brief Psychotic Disorder  - Admitted to [facility] Inpatient Med-Psychiatry Service 11/2023 for treatment  - No home medications  - Recommend outpatient psychiatry referral on discharge  - Will need 1-2 week postpartum mood check    Anemia  - H/H 7.7/27.2 on admit  - 2u pRBC held  - Iron/colace    Post-Partum Hemorrhage risk - low    Naomi Cavazos MD  OB/GYN PGY-1

## 2024-08-16 PROBLEM — O09.33 INSUFFICIENT PRENATAL CARE IN THIRD TRIMESTER: Status: ACTIVE | Noted: 2024-08-16

## 2024-08-16 PROBLEM — O99.013 ANEMIA AFFECTING PREGNANCY IN THIRD TRIMESTER: Status: ACTIVE | Noted: 2024-08-16

## 2024-08-16 PROBLEM — O99.343 MENTAL DISORDER AFFECTING PREGNANCY IN THIRD TRIMESTER: Status: ACTIVE | Noted: 2024-08-16

## 2024-08-16 PROBLEM — D64.9 ACUTE ON CHRONIC ANEMIA: Status: ACTIVE | Noted: 2024-08-16

## 2024-08-16 PROBLEM — D64.9 ANEMIA: Status: ACTIVE | Noted: 2024-08-16

## 2024-08-16 LAB
BASOPHILS # BLD AUTO: 0.03 K/UL (ref 0–0.2)
BASOPHILS # BLD AUTO: 0.04 K/UL (ref 0–0.2)
BASOPHILS NFR BLD: 0.3 % (ref 0–1.9)
BASOPHILS NFR BLD: 0.4 % (ref 0–1.9)
BLD PROD TYP BPU: NORMAL
BLOOD UNIT EXPIRATION DATE: NORMAL
BLOOD UNIT TYPE CODE: 5100
BLOOD UNIT TYPE CODE: 5100
BLOOD UNIT TYPE CODE: 9500
BLOOD UNIT TYPE: NORMAL
CODING SYSTEM: NORMAL
CROSSMATCH INTERPRETATION: NORMAL
DIFFERENTIAL METHOD BLD: ABNORMAL
DIFFERENTIAL METHOD BLD: ABNORMAL
DISPENSE STATUS: NORMAL
EOSINOPHIL # BLD AUTO: 0.1 K/UL (ref 0–0.5)
EOSINOPHIL # BLD AUTO: 0.2 K/UL (ref 0–0.5)
EOSINOPHIL NFR BLD: 1 % (ref 0–8)
EOSINOPHIL NFR BLD: 1.8 % (ref 0–8)
ERYTHROCYTE [DISTWIDTH] IN BLOOD BY AUTOMATED COUNT: 20.1 % (ref 11.5–14.5)
ERYTHROCYTE [DISTWIDTH] IN BLOOD BY AUTOMATED COUNT: 20.4 % (ref 11.5–14.5)
HCT VFR BLD AUTO: 21.2 % (ref 37–48.5)
HCT VFR BLD AUTO: 25 % (ref 37–48.5)
HGB BLD-MCNC: 6.1 G/DL (ref 12–16)
HGB BLD-MCNC: 7.4 G/DL (ref 12–16)
IMM GRANULOCYTES # BLD AUTO: 0.05 K/UL (ref 0–0.04)
IMM GRANULOCYTES # BLD AUTO: 0.08 K/UL (ref 0–0.04)
IMM GRANULOCYTES NFR BLD AUTO: 0.5 % (ref 0–0.5)
IMM GRANULOCYTES NFR BLD AUTO: 0.8 % (ref 0–0.5)
LYMPHOCYTES # BLD AUTO: 2.5 K/UL (ref 1–4.8)
LYMPHOCYTES # BLD AUTO: 2.6 K/UL (ref 1–4.8)
LYMPHOCYTES NFR BLD: 23.2 % (ref 18–48)
LYMPHOCYTES NFR BLD: 26.4 % (ref 18–48)
MCH RBC QN AUTO: 19.7 PG (ref 27–31)
MCH RBC QN AUTO: 20.7 PG (ref 27–31)
MCHC RBC AUTO-ENTMCNC: 28.8 G/DL (ref 32–36)
MCHC RBC AUTO-ENTMCNC: 29.6 G/DL (ref 32–36)
MCV RBC AUTO: 69 FL (ref 82–98)
MCV RBC AUTO: 70 FL (ref 82–98)
MONOCYTES # BLD AUTO: 0.8 K/UL (ref 0.3–1)
MONOCYTES # BLD AUTO: 1 K/UL (ref 0.3–1)
MONOCYTES NFR BLD: 8.2 % (ref 4–15)
MONOCYTES NFR BLD: 8.9 % (ref 4–15)
NEUTROPHILS # BLD AUTO: 6.2 K/UL (ref 1.8–7.7)
NEUTROPHILS # BLD AUTO: 7.2 K/UL (ref 1.8–7.7)
NEUTROPHILS NFR BLD: 62.4 % (ref 38–73)
NEUTROPHILS NFR BLD: 66.1 % (ref 38–73)
NRBC BLD-RTO: 0 /100 WBC
NRBC BLD-RTO: 1 /100 WBC
NUM UNITS TRANS PACKED RBC: NORMAL
PLATELET # BLD AUTO: 260 K/UL (ref 150–450)
PLATELET # BLD AUTO: 275 K/UL (ref 150–450)
PMV BLD AUTO: 10 FL (ref 9.2–12.9)
PMV BLD AUTO: 10 FL (ref 9.2–12.9)
RBC # BLD AUTO: 3.09 M/UL (ref 4–5.4)
RBC # BLD AUTO: 3.58 M/UL (ref 4–5.4)
TRANS ERYTHROCYTES VOL PATIENT: NORMAL ML
TRANS ERYTHROCYTES VOL PATIENT: NORMAL ML
WBC # BLD AUTO: 10.84 K/UL (ref 3.9–12.7)
WBC # BLD AUTO: 9.88 K/UL (ref 3.9–12.7)

## 2024-08-16 PROCEDURE — 99232 SBSQ HOSP IP/OBS MODERATE 35: CPT | Mod: ,,, | Performed by: OBSTETRICS & GYNECOLOGY

## 2024-08-16 PROCEDURE — P9040 RBC LEUKOREDUCED IRRADIATED: HCPCS

## 2024-08-16 PROCEDURE — 86920 COMPATIBILITY TEST SPIN: CPT

## 2024-08-16 PROCEDURE — 25000003 PHARM REV CODE 250

## 2024-08-16 PROCEDURE — 11000001 HC ACUTE MED/SURG PRIVATE ROOM

## 2024-08-16 PROCEDURE — 85025 COMPLETE CBC W/AUTO DIFF WBC: CPT | Mod: 91

## 2024-08-16 PROCEDURE — 30233N1 TRANSFUSION OF NONAUTOLOGOUS RED BLOOD CELLS INTO PERIPHERAL VEIN, PERCUTANEOUS APPROACH: ICD-10-PCS | Performed by: OBSTETRICS & GYNECOLOGY

## 2024-08-16 PROCEDURE — 85025 COMPLETE CBC W/AUTO DIFF WBC: CPT

## 2024-08-16 PROCEDURE — 36415 COLL VENOUS BLD VENIPUNCTURE: CPT

## 2024-08-16 PROCEDURE — 36430 TRANSFUSION BLD/BLD COMPNT: CPT

## 2024-08-16 RX ORDER — HYDROCODONE BITARTRATE AND ACETAMINOPHEN 500; 5 MG/1; MG/1
TABLET ORAL
Status: DISCONTINUED | OUTPATIENT
Start: 2024-08-16 | End: 2024-08-17 | Stop reason: HOSPADM

## 2024-08-16 RX ORDER — IBUPROFEN 600 MG/1
600 TABLET ORAL EVERY 6 HOURS
Qty: 30 TABLET | Refills: 0 | Status: SHIPPED | OUTPATIENT
Start: 2024-08-16 | End: 2024-08-17

## 2024-08-16 RX ADMIN — ACETAMINOPHEN 650 MG: 325 TABLET, FILM COATED ORAL at 02:08

## 2024-08-16 RX ADMIN — IBUPROFEN 600 MG: 600 TABLET, FILM COATED ORAL at 08:08

## 2024-08-16 RX ADMIN — MEDROXYPROGESTERONE ACETATE 150 MG: 150 INJECTION, SUSPENSION INTRAMUSCULAR at 08:08

## 2024-08-16 RX ADMIN — IBUPROFEN 600 MG: 600 TABLET, FILM COATED ORAL at 09:08

## 2024-08-16 RX ADMIN — DOCUSATE SODIUM 200 MG: 100 CAPSULE, LIQUID FILLED ORAL at 08:08

## 2024-08-16 RX ADMIN — IBUPROFEN 600 MG: 600 TABLET, FILM COATED ORAL at 02:08

## 2024-08-16 RX ADMIN — PRENATAL VIT W/ FE FUMARATE-FA TAB 27-0.8 MG 1 TABLET: 27-0.8 TAB at 08:08

## 2024-08-16 RX ADMIN — ACETAMINOPHEN 650 MG: 325 TABLET, FILM COATED ORAL at 08:08

## 2024-08-16 RX ADMIN — ACETAMINOPHEN 650 MG: 325 TABLET, FILM COATED ORAL at 09:08

## 2024-08-16 RX ADMIN — DOCUSATE SODIUM 200 MG: 100 CAPSULE, LIQUID FILLED ORAL at 09:08

## 2024-08-16 NOTE — LACTATION NOTE
"Visited patient in her room, charge nurse at the bedside.  Patient holding baby in her arms.  Patient denied breastfeeding challenges and declined assistance.  Reviewed early feeding cues and encouraged patient to feed baby c cues "8 or more in 24" until the baby is content.  Encouraged to call for assistance prn.    "

## 2024-08-16 NOTE — ANESTHESIA POSTPROCEDURE EVALUATION
Anesthesia Post Evaluation    Patient: Nida Iglesias    Procedure(s) Performed: * No procedures listed *    Final Anesthesia Type: epidural      Patient location during evaluation: labor & delivery  Patient participation: Yes- Able to Participate  Level of consciousness: awake and alert  Post-procedure vital signs: reviewed and stable  Pain management: adequate  Airway patency: patent  NICOLE mitigation strategies: Use of major conduction anesthesia (spinal/epidural) or peripheral nerve block and Multimodal analgesia  PONV status at discharge: No PONV  Anesthetic complications: no      Cardiovascular status: blood pressure returned to baseline and hemodynamically stable  Respiratory status: unassisted and spontaneous ventilation  Hydration status: euvolemic  Follow-up not needed.              Vitals Value Taken Time   /59 08/16/24 1049   Temp 36.7 °C (98 °F) 08/16/24 1049   Pulse 74 08/16/24 1049   Resp 18 08/16/24 1049   SpO2 100 % 08/16/24 1049         No case tracking events are documented in the log.      Pain/Claribel Score: Pain Rating Prior to Med Admin: 0 (8/16/2024  8:02 AM)  Pain Rating Post Med Admin: 0 (8/15/2024  1:55 PM)

## 2024-08-16 NOTE — PLAN OF CARE
Overnight, AVSS. Ambulating and voiding independently without difficulty. Fundus is firm, midline and with moderate bleeding. Breastfeeding successfully independently. Appropriate bonding with baby. Pain well controlled with scheduled PO meds. Safety maintained.        Problem:  Fall Injury Risk  Goal: Absence of Fall, Infant Drop and Related Injury  Outcome: Progressing     Problem: Infection  Goal: Absence of Infection Signs and Symptoms  Outcome: Progressing     Problem: Adult Inpatient Plan of Care  Goal: Plan of Care Review  Outcome: Progressing  Goal: Patient-Specific Goal (Individualized)  Outcome: Progressing  Goal: Absence of Hospital-Acquired Illness or Injury  Outcome: Progressing  Goal: Optimal Comfort and Wellbeing  Outcome: Progressing  Goal: Readiness for Transition of Care  Outcome: Progressing     Problem: Wound  Goal: Optimal Coping  Outcome: Progressing  Goal: Optimal Functional Ability  Outcome: Progressing  Goal: Absence of Infection Signs and Symptoms  Outcome: Progressing  Goal: Improved Oral Intake  Outcome: Progressing  Goal: Optimal Pain Control and Function  Outcome: Progressing  Goal: Skin Health and Integrity  Outcome: Progressing  Goal: Optimal Wound Healing  Outcome: Progressing     Problem: Skin Injury Risk Increased  Goal: Skin Health and Integrity  Outcome: Progressing     Problem: Breastfeeding  Goal: Effective Breastfeeding  Outcome: Progressing     Problem: Postpartum (Vaginal Delivery)  Goal: Successful Parent Role Transition  Outcome: Progressing  Goal: Hemostasis  Outcome: Progressing  Goal: Absence of Infection Signs and Symptoms  Outcome: Progressing  Goal: Anesthesia/Sedation Recovery  Outcome: Progressing  Goal: Optimal Pain Control and Function  Outcome: Progressing  Goal: Effective Urinary Elimination  Outcome: Progressing

## 2024-08-16 NOTE — PROGRESS NOTES
"POSTPARTUM PROGRESS NOTE    Subjective:     PPD/POD#: 1   Procedure:    EGA: 40w3d   N/V: No   F/C: No   Abd Pain: Mild, well-controlled with oral pain medication   Lochia: Mild   Voiding: Yes   Ambulating: Yes   Bowel fnc: Yes   Contraception: DepoProvera prior to discharge       Pt reports feeling well this am. Endorses cramping with breastfeeding, otherwise no complaints. Denies SOB, dizziness, headaches, or vision changes.     Objective:      Temp:  [97.6 °F (36.4 °C)-98.2 °F (36.8 °C)] 97.6 °F (36.4 °C)  Pulse:  [76-91] 76  Resp:  [16-18] 18  SpO2:  [99 %-100 %] 100 %  BP: (107-114)/(56-66) 107/66    Abdomen: Soft, appropriately tender   Uterus: Firm, no fundal tenderness   Incision: N/A     Lab Review    No results for input(s): "NA", "K", "CL", "CO2", "BUN", "CREATININE", "GLU", "PROT", "BILITOT", "ALKPHOS", "ALT", "AST", "MG", "PHOS" in the last 168 hours.    Recent Labs   Lab 24  1722 08/15/24  0258 24  0559   WBC 8.73 9.47 10.84   HGB 7.3* 7.7* 6.1*   HCT 24.4* 27.2* 21.2*   MCV 68* 69* 69*    331 260         I/O    Intake/Output Summary (Last 24 hours) at 2024 0847  Last data filed at 8/15/2024 2230  Gross per 24 hour   Intake 724.87 ml   Output 2100 ml   Net -1375.13 ml        Assessment and Plan:   Postpartum care:  - Patient doing well.  - Continue routine management and advances.    Anemia  -pt asymptomatic   - H/H 7.7/27.2 on admit, now 6. as above   - QBL 100mL  - 2u pRBC held  - discussed above H/H with pt at bedside, agreeable to transfusion of 1u PRBC this am   - pending tranfusion of 1u PRBC with 4hr CBC to follow   - Iron/colace     Considering Adoption  - SW consult placed, appreciate assistance     History of Delusional vs Brief Psychotic Disorder  - Admitted to [facility] Inpatient Med-Psychiatry Service 2023 for treatment  - No home medications  - Recommend outpatient psychiatry referral on discharge  - Will need 1-2 week postpartum mood check      Althea Harrison) " MD Michael   Obstetrics and Gynecology, PGY1

## 2024-08-17 VITALS
OXYGEN SATURATION: 100 % | TEMPERATURE: 99 F | SYSTOLIC BLOOD PRESSURE: 106 MMHG | HEART RATE: 72 BPM | RESPIRATION RATE: 19 BRPM | DIASTOLIC BLOOD PRESSURE: 59 MMHG

## 2024-08-17 PROCEDURE — 25000003 PHARM REV CODE 250

## 2024-08-17 PROCEDURE — 99238 HOSP IP/OBS DSCHRG MGMT 30/<: CPT | Mod: ,,, | Performed by: OBSTETRICS & GYNECOLOGY

## 2024-08-17 RX ORDER — FERROUS SULFATE 325(65) MG
325 TABLET ORAL
Qty: 30 TABLET | Refills: 1 | Status: SHIPPED | OUTPATIENT
Start: 2024-08-17

## 2024-08-17 RX ORDER — IBUPROFEN 600 MG/1
600 TABLET ORAL EVERY 6 HOURS
Qty: 30 TABLET | Refills: 0 | Status: SHIPPED | OUTPATIENT
Start: 2024-08-17

## 2024-08-17 RX ORDER — DOCUSATE SODIUM 100 MG/1
100 CAPSULE, LIQUID FILLED ORAL 2 TIMES DAILY
Qty: 60 CAPSULE | Refills: 1 | Status: SHIPPED | OUTPATIENT
Start: 2024-08-17

## 2024-08-17 RX ADMIN — IBUPROFEN 600 MG: 600 TABLET, FILM COATED ORAL at 02:08

## 2024-08-17 RX ADMIN — DOCUSATE SODIUM 200 MG: 100 CAPSULE, LIQUID FILLED ORAL at 09:08

## 2024-08-17 RX ADMIN — IBUPROFEN 600 MG: 600 TABLET, FILM COATED ORAL at 09:08

## 2024-08-17 RX ADMIN — ACETAMINOPHEN 650 MG: 325 TABLET, FILM COATED ORAL at 09:08

## 2024-08-17 RX ADMIN — ACETAMINOPHEN 650 MG: 325 TABLET, FILM COATED ORAL at 03:08

## 2024-08-17 RX ADMIN — ALUMINUM HYDROXIDE, MAGNESIUM HYDROXIDE, AND DIMETHICONE 10 ML: 400; 400; 40 SUSPENSION ORAL at 03:08

## 2024-08-17 RX ADMIN — ACETAMINOPHEN 650 MG: 325 TABLET, FILM COATED ORAL at 02:08

## 2024-08-17 RX ADMIN — IBUPROFEN 600 MG: 600 TABLET, FILM COATED ORAL at 03:08

## 2024-08-17 RX ADMIN — PRENATAL VIT W/ FE FUMARATE-FA TAB 27-0.8 MG 1 TABLET: 27-0.8 TAB at 09:08

## 2024-08-17 NOTE — PLAN OF CARE
SW received call about car seat assistance for patient. SW spoke with patient on the phone about the issue. Patient stated she had a family member in [place] who will bring her a car seat. Patient asked when she should have her grandmother bring it down. SW stressed ASAP for discharge. Patient voiced understanding and will reach out to family member. SW also submitted [employer] [facility] referral for patient. Patient voiced no other needs or concerns. Patient to reach out to [facility] if she has any issues.

## 2024-08-17 NOTE — PLAN OF CARE
Patient in no apparent distress. VSS. Ambulating without difficulty. No needs at this time. Discharge papers signed. Mother Baby care guide reviewed. RN notified pt to make a one week mood check appointment. Pt verbalized understanding.  All questions answered. Awaiting escort.

## 2024-08-17 NOTE — NURSING
"RN educated pt on filling out birth certificate. Pt has not filled out birth certificate. RN educated pt she has 3 days to come back for an appointment. Pt states " she will make an appointment."  notified. Birth certificate phone number given to pt.   "

## 2024-08-17 NOTE — DISCHARGE SUMMARY
Delivery Discharge Summary  Obstetrics      Primary OB Clinician: Guanakito Lr MD      Admission date: 8/15/2024  Discharge date: 2024    Disposition: To home, self care    Discharge Diagnosis List:      Patient Active Problem List   Diagnosis     (spontaneous vaginal delivery)    History of psychiatric disorder    Anemia    Anemia affecting pregnancy in third trimester    Acute on chronic anemia    Insufficient prenatal care in third trimester    Mental disorder affecting pregnancy in third trimester       Procedure:     Hospital Course:  Nida Iglesias is a 33 y.o. now , PPD #2 who was admitted on 8/15/2024 in labor. Patient was subsequently admitted to labor and delivery unit with signed consents.     Labor course was uncomplicated and resulted in  without complications.   Please see delivery note for further details. Her postpartum course was complicated by anemia. Patient received 1 unit of pRBC, iron and colace. SW team saw the patient and coordinated her needs. On discharge day, patient's pain is controlled with oral pain medications. Pt is tolerating ambulation without SOB or CP, and regular diet without N/V. Reports lochia is mild. Denies any HA, vision changes, F/C, LE swelling. Denies any breast pain/soreness.    Pt in stable condition and ready for discharge. She has been instructed to start and/or continue medications and follow up with her obstetrics provider as listed below.    Pertinent studies:  CBC  Recent Labs   Lab 08/15/24  0258 24  0559 24  1708   WBC 9.47 10.84 9.88   HGB 7.7* 6.1* 7.4*   HCT 27.2* 21.2* 25.0*   MCV 69* 69* 70*    260 275          There is no immunization history for the selected administration types on file for this patient.     Delivery:    Episiotomy: None   Lacerations: None   Repair suture:     Repair # of packets: 1   Blood loss (ml):       Birth information:  YOB: 2022   Time of birth: 6:01 AM   Sex: female  "  Delivery type: Vaginal, Spontaneous   Gestational Age: 40w3d     Measurements    Weight: 3210 g  Weight (lbs): 7 lb 1.2 oz  Length: 50.8 cm  Length (in): 20"  Head circumference: 33.5 cm  Chest circumference: 33 cm         Delivery Clinician: Delivery Providers    Delivering clinician: Guanakito Lr MD   Provider Role    Carmenza Burks MD Resident    Chanel Lombardo, RN Delivery Nurse    Noman, Kelli Gracia, RN Delivery Nurse    Geovany, Smita Luong, RN Delivery Nurse    Zurdo Wiley RN Delivery Nurse    [facility]             Additional  information:  Forceps:    Vacuum:    Breech:    Observed anomalies      Living?:     Apgars    Living status: Living  Apgar Component Scores:  1 min.:  5 min.:  10 min.:  15 min.:  20 min.:    Skin color:  0  1       Heart rate:  2  2       Reflex irritability:  2  2       Muscle tone:  2  2       Respiratory effort:  2  2       Total:  8  9       Apgars assigned by: FARSHAD LEVY RN         Placenta: Delivered:       appearance    Patient Instructions:   Current Discharge Medication List        START taking these medications    Details   docusate sodium (COLACE) 100 MG capsule Take 1 capsule (100 mg total) by mouth 2 (two) times daily.  Qty: 60 capsule, Refills: 1      duke's soln (benadryl 30 mL, mylanta 30 mL, LIDOcaine 30 mL, nystatin 30 mL) 120mL Take 10 mLs by mouth 4 (four) times daily.  Qty: 120 mL, Refills: 0      ferrous sulfate (IRON) 325 mg (65 mg iron) Tab tablet Take 1 tablet (325 mg total) by mouth daily with breakfast.  Qty: 30 tablet, Refills: 1      ibuprofen (ADVIL,MOTRIN) 600 MG tablet Take 1 tablet (600 mg total) by mouth every 6 (six) hours.  Qty: 30 tablet, Refills: 0             Discharge Procedure Orders   BREAST PUMP FOR HOME USE     Order Specific Question Answer Comments   Type of pump: Electric    Weight: 49.6kg    Length of need (1-99 months): 99      Diet Adult Regular     No driving until:   Order Comments: - " Not taking narcotic pain medications  - You feel that you can safely slam on the brakes     Pelvic Rest   Order Comments: Nothing in vagina until evaluation at postpartum visit (no sex, tampons, or douching)     Notify your health care provider if you experience any of the following:  temperature >100.4     Notify your health care provider if you experience any of the following:  persistent nausea and vomiting or diarrhea     Notify your health care provider if you experience any of the following:  severe uncontrolled pain     Notify your health care provider if you experience any of the following:  redness, tenderness, or signs of infection (pain, swelling, redness, odor or green/yellow discharge around incision site)     Notify your health care provider if you experience any of the following:  difficulty breathing or increased cough     Notify your health care provider if you experience any of the following:  severe persistent headache     Notify your health care provider if you experience any of the following:  worsening rash     Notify your health care provider if you experience any of the following:  persistent dizziness, light-headedness, or visual disturbances     Notify your health care provider if you experience any of the following:  increased confusion or weakness     Notify your health care provider if you experience any of the following:   Order Comments: - Heavy vaginal bleeding soaking through more than 2 pads/hour for > 2 hours  - Severe abdominal pain  - Headache not relieved with Tylenol  - Vision changes  - Chest pain or shortness or breath     Activity as tolerated        Follow-up Information       Guanakito Lr MD Follow up in 1 week(s).    Specialty: Obstetrics and Gynecology  Why: 1 wk PP mood check  Contact information:  [address]  [address]  [facility] [address] [phone]                              Rolly Ren MD  Obstetrics and Gynecology- PGY2

## 2024-08-17 NOTE — LACTATION NOTE
08/17/24 1128   Maternal Assessment   Breast Shape Left:;round   Breast Density filling   Areola elastic   Nipples everted   Maternal Infant Feeding   Maternal Emotional State assist needed   Infant Positioning cross-cradle   Signs of Milk Transfer audible swallow;infant jaw motion present   Pain with Feeding no   Nipple Shape After Feeding, Left round   Latch Assistance yes     Pt shared that she remains undecided related to developing an adoption plan for her baby. LC provided reassurance and encouraged Pt to receive support from social work to aid in decision. LC confirmed that Pt has resource information from social work. Pt willing to receive lactation discharge education. LC discussed the principle of supply/demand for milk production.Lactation discharge education completed. Plan of care is for pt to follow basic breastfeeding education, frequent feeding based on baby's cues, and to monitor baby's voids and stools. Breastfeeding section, First Alert survey, resource list, and lactation warmline phone number reviewed. LC provided Pt education on use and maintenance of [place] breast pump. LC reviewed steps to obtain double electric breast pump through insurance. Pt to notify doctor for maternal or infant concerns, as reviewed with . Pt verbalizes understanding and questions answered.

## 2024-08-17 NOTE — PROGRESS NOTES
"POSTPARTUM PROGRESS NOTE    Subjective:     PPD/POD#: 2   Procedure:    EGA: 40w3d   N/V: No   F/C: No   Abd Pain: Mild, well-controlled with oral pain medication   Lochia: Mild   Voiding: Yes   Ambulating: Yes   Bowel fnc: Yes   Contraception: DepoProvera prior to discharge       Objective:      Temp:  [97.3 °F (36.3 °C)-98.6 °F (37 °C)] 98.3 °F (36.8 °C)  Pulse:  [64-96] 82  Resp:  [16-18] 16  SpO2:  [98 %-100 %] 98 %  BP: ()/(52-73) 110/60    Abdomen: Soft, appropriately tender   Uterus: Firm, no fundal tenderness   Incision: N/A     Lab Review    No results for input(s): "NA", "K", "CL", "CO2", "BUN", "CREATININE", "GLU", "PROT", "BILITOT", "ALKPHOS", "ALT", "AST", "MG", "PHOS" in the last 168 hours.    Recent Labs   Lab 08/15/24  0258 24  0559 24  1708   WBC 9.47 10.84 9.88   HGB 7.7* 6.1* 7.4*   HCT 27.2* 21.2* 25.0*   MCV 69* 69* 70*    260 275         I/O    Intake/Output Summary (Last 24 hours) at 2024 0454  Last data filed at 2024 1300  Gross per 24 hour   Intake 275 ml   Output --   Net 275 ml        Assessment and Plan:   Postpartum care:  - Patient doing well.  - Continue routine management and advances.    Anemia  - pt asymptomatic   - H/H: 7.7/27.2 >  mL > 6.1/21.2  > 1 u > 7.4/25  - QBL 100mL   - Iron/colace     Considering Adoption  - SW consult placed, appreciate assistance     History of Delusional vs Brief Psychotic Disorder  - Admitted to [facility] 2023 for treatment  - No home medications  - Recommend outpatient psychiatry referral on discharge  - Will need 1-2 week postpartum mood check      Larissa Desai MD  Obstetrics & Gynecology, PGY-1        "

## 2024-08-19 ENCOUNTER — TELEPHONE (OUTPATIENT)
Dept: OBSTETRICS AND GYNECOLOGY | Facility: CLINIC | Age: 34
End: 2024-08-19
Payer: MEDICAID

## 2024-08-19 NOTE — TELEPHONE ENCOUNTER
LVM for pt to call office in regards to upcoming appointment  ----- Message from Puja Oliver RN sent at 8/17/2024  7:02 PM CDT -----  Hi ,can you please call ms duenas to schedule a 1 week post partum mood check appt. Thank you

## 2024-08-20 ENCOUNTER — TELEPHONE (OUTPATIENT)
Dept: OBSTETRICS AND GYNECOLOGY | Facility: CLINIC | Age: 34
End: 2024-08-20
Payer: MEDICAID

## 2024-08-26 ENCOUNTER — TELEPHONE (OUTPATIENT)
Facility: CLINIC | Age: 34
End: 2024-08-26
Payer: MEDICAID